# Patient Record
Sex: FEMALE | Race: WHITE
[De-identification: names, ages, dates, MRNs, and addresses within clinical notes are randomized per-mention and may not be internally consistent; named-entity substitution may affect disease eponyms.]

---

## 2017-06-14 ENCOUNTER — HOSPITAL ENCOUNTER (OUTPATIENT)
Dept: HOSPITAL 62 - ER | Age: 27
Setting detail: OBSERVATION
LOS: 2 days | Discharge: HOME | End: 2017-06-16
Payer: OTHER GOVERNMENT

## 2017-06-14 DIAGNOSIS — R42: ICD-10-CM

## 2017-06-14 DIAGNOSIS — K80.10: Primary | ICD-10-CM

## 2017-06-14 DIAGNOSIS — Z86.39: ICD-10-CM

## 2017-06-14 DIAGNOSIS — R26.9: ICD-10-CM

## 2017-06-14 DIAGNOSIS — R25.1: ICD-10-CM

## 2017-06-14 DIAGNOSIS — Z98.51: ICD-10-CM

## 2017-06-14 DIAGNOSIS — Z86.2: ICD-10-CM

## 2017-06-14 DIAGNOSIS — H53.8: ICD-10-CM

## 2017-06-14 LAB
ALBUMIN SERPL-MCNC: 4.6 G/DL (ref 3.5–5)
ALP SERPL-CCNC: 124 U/L (ref 38–126)
ALT SERPL-CCNC: 169 U/L (ref 9–52)
ANION GAP SERPL CALC-SCNC: 12 MMOL/L (ref 5–19)
AST SERPL-CCNC: 402 U/L (ref 14–36)
BASOPHILS # BLD AUTO: 0.1 10^3/UL (ref 0–0.2)
BASOPHILS NFR BLD AUTO: 0.6 % (ref 0–2)
BILIRUB DIRECT SERPL-MCNC: 1 MG/DL (ref 0–0.4)
BILIRUB SERPL-MCNC: 1.5 MG/DL (ref 0.2–1.3)
BUN SERPL-MCNC: 9 MG/DL (ref 7–20)
CALCIUM: 9.6 MG/DL (ref 8.4–10.2)
CHLORIDE SERPL-SCNC: 105 MMOL/L (ref 98–107)
CO2 SERPL-SCNC: 25 MMOL/L (ref 22–30)
CREAT SERPL-MCNC: 0.91 MG/DL (ref 0.52–1.25)
EOSINOPHIL # BLD AUTO: 0 10^3/UL (ref 0–0.6)
EOSINOPHIL NFR BLD AUTO: 0.2 % (ref 0–6)
ERYTHROCYTE [DISTWIDTH] IN BLOOD BY AUTOMATED COUNT: 18.1 % (ref 11.5–14)
GLUCOSE SERPL-MCNC: 94 MG/DL (ref 75–110)
HCT VFR BLD CALC: 36.4 % (ref 36–47)
HGB BLD-MCNC: 11.2 G/DL (ref 12–15.5)
HGB HCT DIFFERENCE: -2.8
LYMPHOCYTES # BLD AUTO: 1.1 10^3/UL (ref 0.5–4.7)
LYMPHOCYTES NFR BLD AUTO: 8.9 % (ref 13–45)
MCH RBC QN AUTO: 22.5 PG (ref 27–33.4)
MCHC RBC AUTO-ENTMCNC: 30.6 G/DL (ref 32–36)
MCV RBC AUTO: 73 FL (ref 80–97)
MONOCYTES # BLD AUTO: 0.9 10^3/UL (ref 0.1–1.4)
MONOCYTES NFR BLD AUTO: 7.4 % (ref 3–13)
NEUTROPHILS # BLD AUTO: 10.4 10^3/UL (ref 1.7–8.2)
NEUTS SEG NFR BLD AUTO: 82.9 % (ref 42–78)
POTASSIUM SERPL-SCNC: 4.1 MMOL/L (ref 3.6–5)
PROT SERPL-MCNC: 8.3 G/DL (ref 6.3–8.2)
RBC # BLD AUTO: 4.97 10^6/UL (ref 3.72–5.28)
SODIUM SERPL-SCNC: 142.3 MMOL/L (ref 137–145)
WBC # BLD AUTO: 12.5 10^3/UL (ref 4–10.5)

## 2017-06-14 PROCEDURE — 80053 COMPREHEN METABOLIC PANEL: CPT

## 2017-06-14 PROCEDURE — 80076 HEPATIC FUNCTION PANEL: CPT

## 2017-06-14 PROCEDURE — 76705 ECHO EXAM OF ABDOMEN: CPT

## 2017-06-14 PROCEDURE — 82962 GLUCOSE BLOOD TEST: CPT

## 2017-06-14 PROCEDURE — 84703 CHORIONIC GONADOTROPIN ASSAY: CPT

## 2017-06-14 PROCEDURE — 83690 ASSAY OF LIPASE: CPT

## 2017-06-14 PROCEDURE — 99285 EMERGENCY DEPT VISIT HI MDM: CPT

## 2017-06-14 PROCEDURE — 36415 COLL VENOUS BLD VENIPUNCTURE: CPT

## 2017-06-14 PROCEDURE — 85025 COMPLETE CBC W/AUTO DIFF WBC: CPT

## 2017-06-14 PROCEDURE — 74300 X-RAY BILE DUCTS/PANCREAS: CPT

## 2017-06-14 PROCEDURE — 47563 LAPARO CHOLECYSTECTOMY/GRAPH: CPT

## 2017-06-14 PROCEDURE — 88304 TISSUE EXAM BY PATHOLOGIST: CPT

## 2017-06-14 RX ADMIN — CEFAZOLIN SODIUM SCH ML: 1 SOLUTION INTRAVENOUS at 21:43

## 2017-06-14 NOTE — ER DOCUMENT REPORT
ED Medical Screen (RME)





- General


Mode of Arrival: Ambulatory


Information source: Patient


TRAVEL OUTSIDE OF THE [place] IN LAST 30 DAYS: No





- HPI


Onset: This evening


Associated Symptoms: Other - see notes above





- Related Data


Smoking: Non-smoker


Frequency of alcohol use: None


Drug Abuse: None





<ISSAC TYLER - Last Filed: 06/14/17 20:11>





<GUERORODGERAVA - Last Filed: 06/14/17 21:17>





- General


Chief Complaint: Low Blood Sugar


Stated Complaint: ALLERGIC REACTION


Time Seen by Provider: 06/14/17 17:52


Notes: 


26 year old female with history of low blood sugar during pregnancy presents to 

the ED complaining of back pain and weakness that started earlier this evening. 

Patient reports that she vomited at work and was driving home when she began to 

'lose control of eye sight' and started shaking uncontrollably. Patient called 

her friend's wife and told her that she's going to need help getting in the 

house. When the patient arrived at the house, the patient's friend reports that 

she was drowsy and unable to walk properly.  Patient was also having a 

difficult time keeping her eyes open and was swaying back and forth while 

standing.  The friend states that the episode lasted for approximately 1-2 

hours.  Patient reports that she did have breakfast this morning and was able 

to keep it down.  States that this is the first time she has ever experienced 

anything like this before.  She received an iron infusion on June 12, 2017 

secondary to anemia during pregnancy and had a reaction to it.  She is 

concerned that the symptoms could be a continuous reaction of the iron infusion.

 (ISSAC TYLER)





- Related Data


Allergies/Adverse Reactions: 


 





No Known Allergies Allergy (Unverified 06/14/17 16:38)


 











Past Medical History





- General


Information source: Patient





- Social History


Cigarette use (# per day): No


Chew tobacco use (# tins/day): No


Frequency of alcohol use: None


Drug Abuse: None


Family history: Reviewed & Not Pertinent





- Past Medical History


Cardiac Medical History: Reports: Other - anemia during pregnancy


Endocrine Medical History: Reports: Other - low blood sugar during pregnancy


Renal/ Medical History: Denies: Hx Peritoneal Dialysis





<ISSAC TYLER - Last Filed: 06/14/17 20:11>





Review of Systems





- Review of Systems


Constitutional: No symptoms reported


EENT: See HPI, Blurred vision


Cardiovascular: No symptoms reported


Respiratory: No symptoms reported


Gastrointestinal: See HPI, Vomiting


Genitourinary: No symptoms reported


Female Genitourinary: No symptoms reported


Musculoskeletal: No symptoms reported


Skin: No symptoms reported


Hematologic/Lymphatic: No symptoms reported


Neurological/Psychological: See HPI, Weakness, Gait changes, Other - 

uncontrollable shaking


-: Yes All other systems reviewed and negative





<ISSAC TYLER - Last Filed: 06/14/17 20:11>





Physical Exam





- General


General appearance: Alert, Other - Appears fatigued


In distress: None





- HEENT


Head: Normocephalic, Atraumatic


Eyes: Normal


Extraocular movements intact: Yes


Pupils: PERRL





- Respiratory


Respiratory status: No respiratory distress


Breath sounds: Normal





- Cardiovascular


Rhythm: Regular


Heart sounds: Normal auscultation


Murmur: No


Friction rub: No


Gallop: None auscultated





<ISSAC TYLER - Last Filed: 06/14/17 20:11>





Course





- Laboratory


Result Diagrams: 


 06/14/17 18:37





 06/14/17 18:37





<ISSAC TYLER - Last Filed: 06/14/17 20:11>





- Laboratory


Result Diagrams: 


 06/14/17 18:37





 06/14/17 18:37





<AVA CHOU - Last Filed: 06/14/17 21:17>





- Vital Signs


Vital signs: 





 











Temp Pulse Resp BP Pulse Ox


 


 98.2 F   91   14   123/76   100 


 


 06/14/17 20:45  06/14/17 20:45  06/14/17 20:51  06/14/17 20:45  06/14/17 20:45














- Laboratory


Laboratory results interpreted by me: 





 











  06/14/17 06/14/17





  18:37 18:37


 


WBC  12.5 H 


 


Hgb  11.2 L 


 


MCV  73 L 


 


MCH  22.5 L 


 


MCHC  30.6 L 


 


RDW  18.1 H 


 


Seg Neutrophils %  82.9 H 


 


Lymphocytes %  8.9 L 


 


Absolute Neutrophils  10.4 H 


 


Total Bilirubin   1.5 H


 


Direct Bilirubin   1.0 H


 


AST   402 H


 


ALT   169 H


 


Total Protein   8.3 H














Scribe Documentation





- Scribe


Written by Radha:: Radha Yusuf, 06/14/2017 2004


acting as scribe for :: Mya





<ISSAC TYLER - Last Filed: 06/14/17 20:11>

## 2017-06-14 NOTE — ER DOCUMENT REPORT
ED General





- General


Chief Complaint: Low Blood Sugar


Stated Complaint: ALLERGIC REACTION


Time Seen by Provider: 17 17:52


Mode of Arrival: Ambulatory


Information source: Patient


Notes: 


This is a 26-year-old female with a history of anemia and hypoglycemia during 

pregnancy who presents after an episode of nausea vomiting and dizziness at 

home today.  She states that around noon today while at work she began to 

experience nausea and vomiting.  She then went home and continued to feel 

poorly all day.  She did have abdominal pain earlier today but currently states 

she is feeling better.  She denies current pain.  Denies fevers.  No dysuria.








TRAVEL OUTSIDE OF THE [place] IN LAST 30 DAYS: No





- Related Data


Allergies/Adverse Reactions: 


 





No Known Allergies Allergy (Unverified 17 16:38)


 











Past Medical History





- General


Information source: Patient





- Social History


Smoking Status: Never Smoker


Cigarette use (# per day): No


Chew tobacco use (# tins/day): No


Frequency of alcohol use: None


Drug Abuse: None


Family History: Reviewed & Not Pertinent


Patient has suicidal ideation: No


Patient has homicidal ideation: No





- Past Medical History


Cardiac Medical History: Reports: Other - anemia during pregnancy


Endocrine Medical History: Reports: Other - low blood sugar during pregnancy


Renal/ Medical History: Denies: Hx Peritoneal Dialysis


Past Surgical History: Reports: Hx  Section, Hx Tubal Ligation





Review of Systems





- Review of Systems


Constitutional: No symptoms reported.  denies: Chills, Fever


EENT: No symptoms reported


Cardiovascular: No symptoms reported.  denies: Chest pain


Respiratory: No symptoms reported


Gastrointestinal: See HPI


Genitourinary: No symptoms reported


Musculoskeletal: No symptoms reported


Skin: No symptoms reported


Hematologic/Lymphatic: No symptoms reported


Neurological/Psychological: No symptoms reported





Physical Exam





- Vital signs


Vitals: 


 











Temp Pulse Resp BP Pulse Ox


 


 98.3 F   92   14   118/90 H  98 


 


 17 16:38  17 16:38  17 16:38  17 16:38  17 16:38














- Notes


Notes: 


PHYSICAL EXAMINATION:





GENERAL: Well-appearing, well-nourished and in no acute distress.  Pleasant and 

conversant





HEAD: Atraumatic, normocephalic.





EYES: Pupils equal round and reactive to light, extraocular movements intact, 

sclera anicteric, conjunctiva are normal.





ENT: nares patent, oropharynx clear without exudates.  Moist mucous membranes.





NECK: Normal range of motion, supple without lymphadenopathy





LUNGS: Breath sounds clear to auscultation bilaterally and equal.  No wheezes 

rales or rhonchi.





HEART: Regular rate and rhythm without murmurs





ABDOMEN: Soft, nontender, normoactive bowel sounds.  No guarding, no rebound.  

No masses appreciated.





EXTREMITIES: Normal range of motionis.





NEUROLOGICAL: Alert and oriented 4.  No gross focal motor or sensory deficits 

appreciated





PSYCH: Normal mood, normal affect.





SKIN: Warm, Dry, normal turgor, no rashes or lesions noted.








Course





- Vital Signs


Vital signs: 


 











Temp Pulse Resp BP Pulse Ox


 


 98.2 F   86   16   126/82 H  100 


 


 17 22:17  17 22:17  17 22:17  17 22:17  17 22:17














- Laboratory


Result Diagrams: 


 17 18:37





 17 18:37


Laboratory results interpreted by me: 


 











  17





  18:37 18:37


 


WBC  12.5 H 


 


Hgb  11.2 L 


 


MCV  73 L 


 


MCH  22.5 L 


 


MCHC  30.6 L 


 


RDW  18.1 H 


 


Seg Neutrophils %  82.9 H 


 


Lymphocytes %  8.9 L 


 


Absolute Neutrophils  10.4 H 


 


Total Bilirubin   1.5 H


 


Direct Bilirubin   1.0 H


 


AST   402 H


 


ALT   169 H


 


Total Protein   8.3 H














- Diagnostic Test


Radiology reviewed: Reports reviewed - Abd US:  gallstones, thickened 

gallbladder wall, pericholecystic fluid





Discharge





- Discharge


Clinical Impression: 


 Cholecystitis





Disposition: ADMITTED AS INPATIENT


Admitting Provider: Surgicalist - Dr. Hagan


Unit Admitted: Surgical Floor

## 2017-06-14 NOTE — ER DOCUMENT REPORT
ED General





- General


Chief Complaint: Low Blood Sugar


Stated Complaint: ALLERGIC REACTION


Time Seen by Provider: 06/14/17 17:52


TRAVEL OUTSIDE OF THE [place] IN LAST 30 DAYS: No





- Related Data


Allergies/Adverse Reactions: 


 





No Known Allergies Allergy (Unverified 06/14/17 16:38)


 











Past Medical History





- Social History


Patient has suicidal ideation: No


Patient has homicidal ideation: No


Renal/ Medical History: Denies: Hx Peritoneal Dialysis

## 2017-06-14 NOTE — HISTORY AND PHYSICAL E
History and Physical



NAME: SUMI AHMADI

MRN:  [id number]       : 1990   AGE: 26Y

ADMITTED: 2017                    ROOM: ED13

 



CHIEF COMPLAINT:

Back pain radiating to the abdomen.



HISTORY OF PRESENT ILLNESS:

This is a 26-year-old female who had breakfast at 8:30 this morning,

somewhat fatty and at noontime complained of severe right mid back pain

radiating to the right upper quadrant associated with nausea and vomiting.

She would force herself to vomit to feel better.  She then went to the

emergency room where an ultrasound of the gallbladder was done which

showed gallstones and pericholecystic fluid.  Her liver enzymes are

elevated with an AST of 402, ALT of 169.  Her white count is 12.5.



PAST HISTORY:

She had  section in March for twins.



SOCIAL HISTORY:

Denies smoking or drinking or drug use.



ALLERGIES:

No known.



FAMILY HISTORY:

Claims her mother has history of hypoglycemia and so is the patient.



REVIEW OF SYSTEMS:

As in HPI.  No headaches or shortness of breath or chest pain.  No

diarrhea, no constipation, no dysuria.  The rest of the systems are

unremarkable.



PHYSICAL EXAMINATION:

GENERAL:  A well-developed, well-nourished, 26-year-old female alert and

oriented.  Complained of just mild discomfort along the right mid back.



HEENT:  Neck is supple, no thyromegaly.



LUNGS:  Clear.



HEART:  Regular sinus rhythm.



ABDOMEN:  Soft with minimal tenderness in the right upper quadrant on deep

palpation.



EXTREMITIES:  No edema.



IMPRESSION:

Acute acalculous cholecystitis with slightly elevated liver functions.



PLAN:

The patient started on IV antibiotics, keep n.p.o., repeat blood work in

the morning.





DICTATING PHYSICIAN: SHANELLE MOBLEY M.D.





[id number]                  DT: 2017

PHY#: [id number]            DD: 2017

ID:   [id number]           JOB#: [id number]       ACCT: [id number]



cc:SHANELLE MOBLEY M.D.

>

## 2017-06-14 NOTE — RADIOLOGY REPORT (SQ)
EXAM DESCRIPTION:  U/S ABDOMEN LIMITED W/O DOP



COMPLETED DATE/TIME:  6/14/2017 8:32 pm



REASON FOR STUDY:  elevated LFTs, N/V



COMPARISON:  None.



TECHNIQUE:  Dynamic and static grayscale images acquired of the abdomen and recorded on PACS. Additio
nal selected color Doppler and spectral images recorded.



LIMITATIONS:  None.



FINDINGS:  PANCREAS: No masses.  Visualized pancreatic duct normal caliber.

LIVER: No masses. Echotexture normal.

LIVER VASCULATURE: Normal directional flow of the main portal vein and hepatic veins.

GALLBLADDER: Multiple stones.  The gallbladder wall is thickened.  Pericholecystic fluid.

ULTRASOUND-DETECTED COONEY'S SIGN: Negative.

INTRAHEPATIC DUCTS AND COMMON DUCT: CBD and intrahepatic ducts normal caliber. No filling defects.

INFERIOR VENA CAVA: Normal flow.

AORTA: No aneurysm.

RIGHT KIDNEY:  Normal size. Normal echogenicity. No solid or suspicious masses. No hydronephrosis. No
 calcifications.

PERITONEAL AND RIGHT PLEURAL SPACE: No ascites or effusions.

OTHER: No other significant findings.



IMPRESSION:  Cholelithiasis and cholecystitis.



TECHNICAL DOCUMENTATION:  JOB ID:  [id number]

 2011 [employer]- All Rights Reserved

## 2017-06-15 LAB
ALBUMIN SERPL-MCNC: 3.4 G/DL (ref 3.5–5)
ALP SERPL-CCNC: 120 U/L (ref 38–126)
ALT SERPL-CCNC: 216 U/L (ref 9–52)
AST SERPL-CCNC: 263 U/L (ref 14–36)
BASOPHILS # BLD AUTO: 0.1 10^3/UL (ref 0–0.2)
BASOPHILS NFR BLD AUTO: 1.2 % (ref 0–2)
BILIRUB DIRECT SERPL-MCNC: 0.3 MG/DL (ref 0–0.4)
BILIRUB SERPL-MCNC: 0.7 MG/DL (ref 0.2–1.3)
EOSINOPHIL # BLD AUTO: 0.2 10^3/UL (ref 0–0.6)
EOSINOPHIL NFR BLD AUTO: 3 % (ref 0–6)
ERYTHROCYTE [DISTWIDTH] IN BLOOD BY AUTOMATED COUNT: 17.7 % (ref 11.5–14)
HCT VFR BLD CALC: 30.3 % (ref 36–47)
HGB BLD-MCNC: 9.6 G/DL (ref 12–15.5)
HGB HCT DIFFERENCE: -1.5
LIPASE SERPL-CCNC: 77.7 U/L (ref 23–300)
LYMPHOCYTES # BLD AUTO: 1.6 10^3/UL (ref 0.5–4.7)
LYMPHOCYTES NFR BLD AUTO: 22.3 % (ref 13–45)
MCH RBC QN AUTO: 22.9 PG (ref 27–33.4)
MCHC RBC AUTO-ENTMCNC: 31.6 G/DL (ref 32–36)
MCV RBC AUTO: 73 FL (ref 80–97)
MONOCYTES # BLD AUTO: 0.7 10^3/UL (ref 0.1–1.4)
MONOCYTES NFR BLD AUTO: 9.6 % (ref 3–13)
NEUTROPHILS # BLD AUTO: 4.5 10^3/UL (ref 1.7–8.2)
NEUTS SEG NFR BLD AUTO: 63.9 % (ref 42–78)
PROT SERPL-MCNC: 6.4 G/DL (ref 6.3–8.2)
RBC # BLD AUTO: 4.18 10^6/UL (ref 3.72–5.28)
WBC # BLD AUTO: 7.1 10^3/UL (ref 4–10.5)

## 2017-06-15 PROCEDURE — BF131ZZ FLUOROSCOPY OF GALLBLADDER AND BILE DUCTS USING LOW OSMOLAR CONTRAST: ICD-10-PCS | Performed by: SURGERY

## 2017-06-15 PROCEDURE — 0FT44ZZ RESECTION OF GALLBLADDER, PERCUTANEOUS ENDOSCOPIC APPROACH: ICD-10-PCS | Performed by: SURGERY

## 2017-06-15 RX ADMIN — HYDROMORPHONE HYDROCHLORIDE PRN MG: 2 INJECTION INTRAMUSCULAR; INTRAVENOUS; SUBCUTANEOUS at 23:15

## 2017-06-15 RX ADMIN — CEFAZOLIN SODIUM SCH ML: 1 SOLUTION INTRAVENOUS at 22:02

## 2017-06-15 RX ADMIN — SODIUM CHLORIDE PRN ML: 9 INJECTION, SOLUTION INTRAVENOUS at 07:36

## 2017-06-15 RX ADMIN — CEFAZOLIN SODIUM SCH ML: 1 SOLUTION INTRAVENOUS at 05:28

## 2017-06-15 RX ADMIN — HYDROMORPHONE HYDROCHLORIDE PRN MG: 2 INJECTION INTRAMUSCULAR; INTRAVENOUS; SUBCUTANEOUS at 10:15

## 2017-06-15 RX ADMIN — CEFAZOLIN SODIUM SCH ML: 1 SOLUTION INTRAVENOUS at 16:28

## 2017-06-15 RX ADMIN — SODIUM CHLORIDE PRN ML: 9 INJECTION, SOLUTION INTRAVENOUS at 23:19

## 2017-06-15 NOTE — RADIOLOGY REPORT (SQ)
EXAM DESCRIPTION:  CHOLANGIOGRAM OPERATIVE



COMPLETED DATE/TIME:  6/15/2017 1:23 pm



REASON FOR STUDY:  CHOLANGIOGRAM IN OR



COMPARISON:  None.



FLUOROSCOPY TIME:  0.5 minutes

2 series of images saved to PACS.



TECHNIQUE:  Cinegraphic images were obtained from an intraoperative cholangiogram.



LIMITATIONS:  None.



FINDINGS:  There is opacification of the bile ducts, cystic duct remnants and second portion of the d
uodenum without evidence of fixed filling defect or significant extravasation.



IMPRESSION:  INTRAOPERATIVE CHOLANGIOGRAM.



COMMENT:  Quality :  Final reports for procedures using fluoroscopy that document radiation exp
osure indices, or exposure time and number of fluorographic images (if radiation exposure indices are
 not available)



TECHNICAL DOCUMENTATION:  JOB ID:  [id number]

 2011 [employer]- All Rights Reserved

## 2017-06-15 NOTE — OPERATIVE REPORT E
Operative Report



NAME: SUMI AHMADI

MRN:  [id number]          : 1990 AGE:  26Y

DATE OF SURGERY: 06/15/2017              ROOM: [id number]



PREOPERATIVE DIAGNOSIS:

Acute calculus cholecystitis and possible passage of common bile duct

stone.



POSTOPERATIVE DIAGNOSIS:

Acute calculus cholecystitis and possible passage of common bile duct

stone.



PROCEDURE:

Laparoscopic cholecystectomy, cystic duct cholangiogram.



SURGEON:

SHANELLE MOBLEY M.D.



ANESTHESIA:

General.



INDICATION:

This is a 26-year-old female who complained or right back pain radiating

to the right upper quadrant yesterday after eating pizza.  She had an

ultrasound which showed gallstones and pericholecystic fluid.  Her liver

enzymes slightly elevated, but came down slightly this morning.  Her pain

is mostly in the right back and mildly in the right upper quadrant.



DESCRIPTION OF PROCEDURE:

After adequate general anesthesia, the patient was placed in supine

position.  The abdomen prepped and draped in the usual sterile fashion. 

Appropriate time-out was performed.



An infraumbilical incision was made and the fascia identified and divided.

Mio trocar passed through the fascia into the abdominal cavity.  CO2

insufflated through the Mio trocar to a pressure of 50 mmHg and 3

others trocars at 12 mm in the subxiphoid and two 5 mm in the right upper

quadrant were placed under direct vision.  The gallbladder was then

grasped at the fundus and a lot of adhesions noted on the gallbladder that

were subsequently lysed bluntly and with the use of cautery.  The cystic

duct was then identified and isolated.  Noted to be slightly dilated.  The

cystic artery was identified and clipped with hemoclips and divided

between the hemoclips.  Next, the cystic duct was then cannulated with the

catheter and a cholangiogram was then performed.  There is no evidence of

occlusion or obstruction at the common bile duct.  The common bile duct

appears just slightly dilated, but easily goes into the small intestine. 

The cholangiocatheter was then removed and the cystic duct clipped and

divided between the hemoclips.  The gallbladder was then taken off the

liver bed with use of the hook cautery.  Hemostasis was noted.  The

gallbladder was then placed in an Endobag and pulled out through the

umbilical port.  The liver bed was then inspected and adequate hemostasis

noted.  It was gently irrigated.  Minimal spillage from the cystic duct

was noted, but the gallbladder itself was intact.  All of the trocars were

removed and no obvious bleeding from the trocar sites noted.  The fascial

defect at the infraumbilical area was then closed with figure-of-eight

suture using 0 Vicryl and all the skin incisions closed with subcuticular

closure using 4-0 Vicryl undyed.  Sterile dressings were placed over the

operative sites.  Needle, instrument, and sponge counts were all correct. 

Estimated blood loss was minimal.  Patient then brought to the recovery

room in satisfactory condition.





DICTATING PHYSICIAN:  SHANELLE MOBLEY M.D.





[id number]                  DT: 06/15/2017    1334

PHY#: [id number]            DD: 06/15/2017    1334

ID:   [id number]           JOB#: [id number]       ACCT: [id number]



cc:SHANELLE MOBLEY M.D.

>

## 2017-06-16 VITALS — SYSTOLIC BLOOD PRESSURE: 110 MMHG | DIASTOLIC BLOOD PRESSURE: 60 MMHG

## 2017-06-16 RX ADMIN — CEFAZOLIN SODIUM SCH ML: 1 SOLUTION INTRAVENOUS at 05:18

## 2017-06-16 RX ADMIN — HYDROMORPHONE HYDROCHLORIDE PRN MG: 2 INJECTION INTRAMUSCULAR; INTRAVENOUS; SUBCUTANEOUS at 05:24

## 2017-06-16 NOTE — DISCHARGE SUMMARY E
Discharge Summary



NAME: SUMI AHMADI

MRN:  [id number]        : 1990     AGE: 26Y

ADMITTED: 2017                  DISCHARGED: 2017





PROCEDURE DONE:

Laparoscopic cholecystectomy, 06/15/2017.



HOSPITAL COURSE:

This is a 26-year-old female who complained of right back pains radiating

to the right upper quadrant on 2017.  Ultrasound of the abdomen done

in the emergency room on the evening of 2017 showed gallstones.  Her

liver function is slightly elevated.



On 06/15/2017, a repeat liver function showed slightly decreased.  Patient

then underwent laparoscopic cholecystectomy with cystic ductal angiogram. 

The cystic ductal angiogram showed no evidence of bile duct obstruction.



On 2017, patient tolerating her diet well and minimal pains

primarily along the umbilical port site.



DISPOSITION:

Patient will be discharged with above final diagnosis.



PROCEDURES:

Included cystic ductal angiogram and cholecystectomy.



DISCHARGE PLAN:

1.  Patient tolerating regular diet.

2.  Avoid lifting more than 15 pounds for the next 2 weeks, then gradually

increase it to unrestricted for another 3 weeks.  A note is given for this

regarding work.

3.  Patient just wants Tylenol to take p.r.n. for pain.

4.  Patient will be scheduled for surgical clinic followup in about 2

weeks.









DICTATING PHYSICIAN:  SHANELLE MOBLEY M.D.





[id number]                  DT: 2017    0632

PHY#: [id number]            DD: [id number]

ID:   [id number]           JOB#: [id number]       ACCT: [id number]



cc:SHANELLE MOBLEY M.D.

   [facility],

## 2018-03-18 ENCOUNTER — HOSPITAL ENCOUNTER (EMERGENCY)
Dept: HOSPITAL 62 - ER | Age: 28
Discharge: HOME | End: 2018-03-18
Payer: OTHER GOVERNMENT

## 2018-03-18 VITALS — SYSTOLIC BLOOD PRESSURE: 99 MMHG | DIASTOLIC BLOOD PRESSURE: 68 MMHG

## 2018-03-18 DIAGNOSIS — K85.20: Primary | ICD-10-CM

## 2018-03-18 DIAGNOSIS — R10.13: ICD-10-CM

## 2018-03-18 DIAGNOSIS — R11.2: ICD-10-CM

## 2018-03-18 LAB
ADD MANUAL DIFF: NO
ALBUMIN SERPL-MCNC: 3.8 G/DL (ref 3.5–5)
ALP SERPL-CCNC: 109 U/L (ref 38–126)
ALT SERPL-CCNC: 89 U/L (ref 9–52)
ANION GAP SERPL CALC-SCNC: 9 MMOL/L (ref 5–19)
APPEARANCE UR: (no result)
APTT PPP: YELLOW S
AST SERPL-CCNC: 171 U/L (ref 14–36)
BASOPHILS # BLD AUTO: 0.1 10^3/UL (ref 0–0.2)
BASOPHILS NFR BLD AUTO: 0.5 % (ref 0–2)
BILIRUB DIRECT SERPL-MCNC: 0.6 MG/DL (ref 0–0.4)
BILIRUB SERPL-MCNC: 0.8 MG/DL (ref 0.2–1.3)
BILIRUB UR QL STRIP: NEGATIVE
BUN SERPL-MCNC: 8 MG/DL (ref 7–20)
CALCIUM: 9.5 MG/DL (ref 8.4–10.2)
CHLORIDE SERPL-SCNC: 104 MMOL/L (ref 98–107)
CO2 SERPL-SCNC: 26 MMOL/L (ref 22–30)
EOSINOPHIL # BLD AUTO: 0.1 10^3/UL (ref 0–0.6)
EOSINOPHIL NFR BLD AUTO: 1.3 % (ref 0–6)
ERYTHROCYTE [DISTWIDTH] IN BLOOD BY AUTOMATED COUNT: 13.1 % (ref 11.5–14)
GLUCOSE SERPL-MCNC: 92 MG/DL (ref 75–110)
GLUCOSE UR STRIP-MCNC: NEGATIVE MG/DL
HCT VFR BLD CALC: 39.2 % (ref 36–47)
HGB BLD-MCNC: 13.1 G/DL (ref 12–15.5)
KETONES UR STRIP-MCNC: NEGATIVE MG/DL
LIPASE SERPL-CCNC: 1028.5 U/L (ref 23–300)
LYMPHOCYTES # BLD AUTO: 1.1 10^3/UL (ref 0.5–4.7)
LYMPHOCYTES NFR BLD AUTO: 9.7 % (ref 13–45)
MCH RBC QN AUTO: 29.3 PG (ref 27–33.4)
MCHC RBC AUTO-ENTMCNC: 33.5 G/DL (ref 32–36)
MCV RBC AUTO: 87 FL (ref 80–97)
MONOCYTES # BLD AUTO: 1.1 10^3/UL (ref 0.1–1.4)
MONOCYTES NFR BLD AUTO: 10.1 % (ref 3–13)
NEUTROPHILS # BLD AUTO: 8.6 10^3/UL (ref 1.7–8.2)
NEUTS SEG NFR BLD AUTO: 78.4 % (ref 42–78)
NITRITE UR QL STRIP: NEGATIVE
PH UR STRIP: 7 [PH] (ref 5–9)
PLATELET # BLD: 257 10^3/UL (ref 150–450)
POTASSIUM SERPL-SCNC: 4.1 MMOL/L (ref 3.6–5)
PROT SERPL-MCNC: 6.7 G/DL (ref 6.3–8.2)
PROT UR STRIP-MCNC: NEGATIVE MG/DL
RBC # BLD AUTO: 4.49 10^6/UL (ref 3.72–5.28)
SODIUM SERPL-SCNC: 138.9 MMOL/L (ref 137–145)
SP GR UR STRIP: 1.01
TOTAL CELLS COUNTED % (AUTO): 100 %
UROBILINOGEN UR-MCNC: 4 MG/DL (ref ?–2)
WBC # BLD AUTO: 11 10^3/UL (ref 4–10.5)

## 2018-03-18 PROCEDURE — 81001 URINALYSIS AUTO W/SCOPE: CPT

## 2018-03-18 PROCEDURE — 96360 HYDRATION IV INFUSION INIT: CPT

## 2018-03-18 PROCEDURE — 85025 COMPLETE CBC W/AUTO DIFF WBC: CPT

## 2018-03-18 PROCEDURE — 83690 ASSAY OF LIPASE: CPT

## 2018-03-18 PROCEDURE — 80053 COMPREHEN METABOLIC PANEL: CPT

## 2018-03-18 PROCEDURE — 36415 COLL VENOUS BLD VENIPUNCTURE: CPT

## 2018-03-18 PROCEDURE — 99285 EMERGENCY DEPT VISIT HI MDM: CPT

## 2018-03-18 PROCEDURE — 84703 CHORIONIC GONADOTROPIN ASSAY: CPT

## 2018-03-18 NOTE — ER DOCUMENT REPORT
ED Medical Screen (RME)





- General


Chief Complaint: Breathing Difficulty


Stated Complaint: TROUBLE BREATHING


Time Seen by Provider: 18 05:22


Mode of Arrival: Ambulatory


Information source: Patient


Notes: 





Patient states that she woke up at 2 AM with nausea and epigastric abdominal 

tenderness.  Patient reports vomiting air.  Patient denies any fever or cough.  

Patient denies any diarrhea.





hx: PE, cholecystectomy, , tubal ligation





I have greeted and performed a rapid initial assessment of this patient.  A 

comprehensive ED assessment and evaluation of the patient, analysis of test 

results and completion of the medical decision making process will be conducted 

by additional ED providers.





- Related Data


Allergies/Adverse Reactions: 


 





No Known Allergies Allergy (Unverified 17 16:38)


 











Past Medical History





- Social History


Family history: Reviewed & Not Pertinent


Renal/ Medical History: Denies: Hx Peritoneal Dialysis


Past Surgical History: Reports: Hx  Section, Hx Tubal Ligation





Physical Exam





- Vital signs


Vitals: 





 











Temp Pulse Resp BP Pulse Ox


 


 97.7 F   92   20   102/58 L  99 


 


 18 04:23  18 04:23  18 04:23  18 04:23  18 04:23














- Abdominal


Tenderness: Tender - Epigastric





Course





- Vital Signs


Vital signs: 





 











Temp Pulse Resp BP Pulse Ox


 


 97.7 F   92   20   102/58 L  99 


 


 18 04:23  18 04:23  18 04:23  18 04:23  18 04:23

## 2018-03-18 NOTE — ER DOCUMENT REPORT
ED GI/





- General


Chief Complaint: Breathing Difficulty


Stated Complaint: TROUBLE BREATHING


Time Seen by Provider: 18 05:22


Mode of Arrival: Ambulatory


Notes: 


Patient is a 27-year-old female who presents with mild epigastric pain and 

vomiting last night after drinking alcohol.  She felt air bubble pushing up 

against her diaphragm. She said she vomited air and then felt better.  She 

feels much better on arrival to the ER.  Patient is no longer having any 

abdominal pain, nausea or vomiting.  She denies fevers, urinary symptoms, 

hematemesis, chest pain, back pain, diarrhea or constipation.


TRAVEL OUTSIDE OF THE [place] IN LAST 30 DAYS: Yes





- Related Data


Allergies/Adverse Reactions: 


 





No Known Allergies Allergy (Unverified 17 16:38)


 











Past Medical History





- General


Information source: Patient





- Social History


Smoking Status: Unknown if Ever Smoked


Family History: Reviewed & Not Pertinent


Patient has suicidal ideation: No


Patient has homicidal ideation: No


Renal/ Medical History: Denies: Hx Peritoneal Dialysis


Past Surgical History: Reports: Hx  Section, Hx Tubal Ligation





Review of Systems





- Review of Systems


Notes: 


REVIEW OF SYSTEMS:


CONSTITUTIONAL: -fevers, -chills


EENT: -eye pain, -difficulty swallowing, -nasal congestion


CARDIOVASCULAR: -chest pain, -syncope.


RESPIRATORY: -cough


GASTROINTESTINAL: +abdominal pain, +nausea, +vomiting, -diarrhea


GENITOURINARY: -dysuria, -hematuria


MUSCULOSKELETAL: -back pain, -neck pain


SKIN: -rash or skin lesions.


HEMATOLOGIC: -easy bruising or bleeding.


LYMPHATIC: -swollen, enlarged glands.


NEUROLOGICAL: -altered mental status or loss of consciousness, -headache, -

neurologic symptoms


PSYCHIATRIC: -anxiety, -depression.


ALL OTHER SYSTEMS REVIEWED AND NEGATIVE.





Physical Exam





- Vital signs


Vitals: 


 











Temp Pulse Resp BP Pulse Ox


 


 97.7 F   92   20   102/58 L  99 


 


 18 04:23  18 04:23  18 04:23  18 04:23  18 04:23














- Notes


Notes: 


PHYSICAL EXAMINATION:


GENERAL: Well-appearing, well-nourished and in no acute distress.


HEAD: Atraumatic, normocephalic.


EYES: Pupils equal round and reactive to light, extraocular movements intact, 

sclera anicteric, conjunctiva are normal.


ENT: nares patent, oropharynx clear without exudates.  Moist mucous membranes.


NECK: Normal range of motion, supple without lymphadenopathy


LUNGS: Breath sounds clear to auscultation bilaterally and equal.  No wheezes 

rales or rhonchi.


HEART: Regular rate and rhythm without murmurs


ABDOMEN: Soft, nontender, normoactive bowel sounds.  No guarding, no rebound.  

No masses appreciated.


EXTREMITIES: Normal range of motion, no pitting or edema.  No cyanosis.


NEUROLOGICAL: Cranial nerves grossly intact.  Normal speech, normal gait.  

Normal sensory and motor exams.


PSYCH: Normal mood, normal affect.


SKIN: Warm, Dry, normal turgor, no rashes or lesions noted.





Course





- Re-evaluation


Re-evalutation: 


Patient with no abdominal pain or tenderness at this time.  She has elevated 

lipase, but clinically does not have severe pancreatitis.  She already had her 

gallbladder removed and suspect the pancreatitis is most likely related to her 

alcohol use last night. Pt also with 32 WBCs in urine, but she does not have 

any signs of UTI or pyelonephritis at this time, so we will not treat her with 

Abx. Will provide her with antiemetics, pain control and following up with her 

primary care physician and GI doctor.  Given very strict return precautions and 

she understands.





- Vital Signs


Vital signs: 


 











Temp Pulse Resp BP Pulse Ox


 


 97.7 F   92   20   102/58 L  99 


 


 18 04:23  18 04:23  18 04:23  18 04:23  18 04:23














- Laboratory


Result Diagrams: 


 18 05:40





 18 05:40


Laboratory results interpreted by me: 


 











  18





  05:40 05:40 07:11


 


WBC  11.0 H  


 


Seg Neutrophils %  78.4 H  


 


Lymphocytes %  9.7 L  


 


Absolute Neutrophils  8.6 H  


 


Direct Bilirubin   0.6 H 


 


AST   171 H 


 


ALT   89 H 


 


Lipase   1028.5 H 


 


Urine Urobilinogen    4.0 H


 


Ur Leukocyte Esterase    SMALL H














Discharge





- Discharge


Clinical Impression: 


Pancreatitis


Qualifiers:


 Chronicity: acute Pancreatitis type: alcohol induced Acute pancreatitis 

complication: unspecified Qualified Code(s): K85.20 - Alcohol induced acute 

pancreatitis without necrosis or infection





Condition: Stable


Disposition: HOME, SELF-CARE


Additional Instructions: 


Pancreatitis





     Pancreatitis is an inflammation of the pancreas, an organ at the back of 

your abdomen.  The pancreas produces insulin and enzymes that digest your food.

  Pancreatitis can be caused by gallstones in the bile duct, by alcohol or 

viruses, or by excess fat or calcium in the blood stream.  Occasionally, 

pancreatitis occurs when a stomach ulcer burns through into the pancreas.  We 

try to find the cause of pancreatitis, but some tests can't be done until the 

pancreas heals.


     The usual symptoms of pancreatitis are pain in the pit of the stomach that 

goes straight through to the back, vomiting, and low-grade fever.  Severe cases 

require hospital admission, but many patients with mild pancreatitis do well at 

home.


     You will probably need medicine for pain and for vomiting. Sometimes we 

prescribe medicine to decrease stomach acid secretion and to decrease flow of 

pancreatic juices.  Start with a diet of clear liquids (soda pop, juices).  

When the pain is decreasing, you can add some simple starches (potato, toast, 

applesauce).  Avoid proteins and fats until you are completely painfree.


     When you're better, your doctor may suggest treatment to prevent future 

pancreatitis (such as gallbladder removal).  Avoid alcohol forever.  Get 

immediate treatment for any future episodes.


     Contact your doctor at once or return here if you have increasing pain, 

shortness of breath, general swelling, increasing size of the abdomen, 

continued vomiting, muscle spasms, or other new symptoms.


Prescriptions: 


Acetaminophen with Codeine [Tylenol #3 Tablet] 1 each PO Q4HP PRN #10 tablet


 PRN Reason: 


Ondansetron [Zofran Odt 4 mg Tablet] 1 - 2 tab PO Q4H PRN #15 tab.rapdis


 PRN Reason: For Nausea/Vomiting


Referrals: 


ALEJANDRINA WILSON DO [Primary Care Provider] - Follow up as needed


CHAPINCITO KAY MD [ACTIVE STAFF] - Follow up as needed